# Patient Record
Sex: MALE | Race: WHITE | URBAN - METROPOLITAN AREA
[De-identification: names, ages, dates, MRNs, and addresses within clinical notes are randomized per-mention and may not be internally consistent; named-entity substitution may affect disease eponyms.]

---

## 2018-08-22 ENCOUNTER — OFFICE VISIT (OUTPATIENT)
Dept: FAMILY MEDICINE CLINIC | Age: 23
End: 2018-08-22

## 2018-08-22 VITALS
SYSTOLIC BLOOD PRESSURE: 103 MMHG | RESPIRATION RATE: 16 BRPM | WEIGHT: 181.2 LBS | HEIGHT: 71 IN | OXYGEN SATURATION: 100 % | HEART RATE: 54 BPM | BODY MASS INDEX: 25.37 KG/M2 | TEMPERATURE: 97.5 F | DIASTOLIC BLOOD PRESSURE: 60 MMHG

## 2018-08-22 DIAGNOSIS — E78.49 FAMILIAL HYPERLIPIDEMIA: ICD-10-CM

## 2018-08-22 DIAGNOSIS — Z76.89 ESTABLISHING CARE WITH NEW DOCTOR, ENCOUNTER FOR: ICD-10-CM

## 2018-08-22 DIAGNOSIS — Z00.00 ANNUAL PHYSICAL EXAM: Primary | ICD-10-CM

## 2018-08-22 DIAGNOSIS — Z13.1 SCREENING FOR DIABETES MELLITUS: ICD-10-CM

## 2018-08-22 NOTE — PROGRESS NOTES
Subjective:   Dorian Alfaro is a 21 y.o. male presenting for his annual checkup. He's new to our practice. Previous PCP: none. Goes to the gym at least once a day, sometimes twice, goes to gym 5-6X/week. Plays la-crost and ride bike at home. Denies CP, SOB, ALBERTS or syncope. Graduated college. Familial Hypercholesterolemia, and his father. Even at a younger age he have high cholesterol. ROS:  Feeling well. No dyspnea or chest pain on exertion. No abdominal pain, change in bowel habits, black or bloody stools. No urinary tract or prostatic symptoms. No neurological complaints. Patient Active Problem List    Diagnosis Date Noted    Hypercholesterolemia 07/13/2010    Lymphadenitis 02/09/2010     Current Outpatient Prescriptions   Medication Sig Dispense Refill    Psyllium Husk-Sucrose 3.4 gram/7 gram powd Take  by mouth. No Known Allergies  Past Surgical History:   Procedure Laterality Date    HX HEENT      IP CONSULT TO PLASTIC SURGERY      nose placed back      Family History   Problem Relation Age of Onset    Cancer Mother      cervical    Elevated Lipids Father      Social History   Substance Use Topics    Smoking status: Never Smoker    Smokeless tobacco: Never Used    Alcohol use 3.6 oz/week     5 Cans of beer, 1 Glasses of wine per week      Comment: 3 beers and 3 shot once a month          Objective:     Visit Vitals    /60 (BP 1 Location: Left arm, BP Patient Position: Sitting)    Pulse (!) 54    Temp 97.5 °F (36.4 °C) (Oral)    Resp 16    Ht 5' 10.5\" (1.791 m)    Wt 181 lb 3.2 oz (82.2 kg)    SpO2 100%    BMI 25.63 kg/m2       General:  Alert, cooperative, no distress, appears stated age. Head:  Normocephalic, without obvious abnormality, atraumatic. Eyes:  Conjunctivae/corneas clear. PERRL, EOMs intact. Ears:  Normal TMs and external ear canals both ears.    Throat: Lips, mucosa, and tongue normal. Teeth and gums normal.   Neck: Supple, symmetrical, trachea midline, no carotid bruit and no JVD. Lungs:   Clear to auscultation bilaterally. Heart:  Regular rate and rhythm, S1, S2 normal, no murmur, click, rub or gallop. Abdomen:   Soft, non-tender. Genitalia:  Pt refuse exam.     Extremities: Extremities normal, atraumatic, no cyanosis or edema. Pulses: 2+ and symmetric all extremities. Neurologic: CNII-XII intact. Normal strength, sensation and reflexes throughout. Assessment/Plan:     Diagnoses and all orders for this visit:    1. Annual physical exam  -     CBC W/O DIFF  -     HEMOGLOBIN A1C W/O EAG  -     METABOLIC PANEL, COMPREHENSIVE  -     TSH RFX ON ABNORMAL TO FREE T4  -     URINALYSIS W/ RFLX MICROSCOPIC  -     HIV 1/2 AG/AB, 4TH GENERATION,W RFLX CONFIRM  -     NMR LIPOPROFILE  -     LIPOPROTEIN (A)    2. Familial hyperlipidemia  -     NMR LIPOPROFILE  -     LIPOPROTEIN (A)    3. Screening for diabetes mellitus  -     HEMOGLOBIN A1C W/O EAG    4. Establishing care with new doctor, encounter for      Follow-up Disposition:  Return in about 1 week (around 8/29/2018) for labs review. Ayah Muhammad MD  8/22/2018

## 2018-08-22 NOTE — MR AVS SNAPSHOT
[address] [facility] [address] [facility] 
[phone] Patient: Kamini Cabrera MRN:  [id number] Visit Information Date & Time Provider Department Dept. Phone Encounter #  
 8/22/2018  9:20 AM Jorge L Byrd MD [facility] at [address] [id number] Follow-up Instructions Return in about 1 week (around 8/29/2018) for labs review. Upcoming Health Maintenance Date Due DTaP/Tdap/Td series (2 - Td) 6/26/2016 Influenza Age 5 to Adult 8/1/2018 Allergies as of 8/22/2018  Review Complete On: 8/22/2018 By: Jorge L Byrd MD  
 No Known Allergies Current Immunizations  Never Reviewed Name Date HPV (Quad) 8/13/2013, 7/10/2013 Hep A Vaccine (Adult) 7/10/2013 Influenza Vaccine Whole 10/9/2009 Meningococcal (MCV4P) Vaccine 7/10/2013 Meningococcal Vaccine 12/30/2008 TDAP Vaccine 6/26/2006 Varicella Virus Vaccine 7/10/2013 Varicella Virus Vaccine Live 12/30/2008 Not reviewed this visit You Were Diagnosed With   
  
 Codes Comments Annual physical exam    -  Primary ICD-10-CM: Z00.00 ICD-9-CM: V70.0 Familial hyperlipidemia     ICD-10-CM: E78.4 ICD-9-CM: 272.4 Screening for diabetes mellitus     ICD-10-CM: Z13.1 ICD-9-CM: V77.1 Establishing care with new doctor, encounter for     ICD-10-CM: Z76.89 
ICD-9-CM: V65.8 Vitals BP Pulse Temp Resp Height(growth percentile) Weight(growth percentile) 103/60 (BP 1 Location: Left arm, BP Patient Position: Sitting) (!) 54 97.5 °F (36.4 °C) (Oral) 16 5' 10.5\" (1.791 m) 181 lb 3.2 oz (82.2 kg) SpO2 BMI Smoking Status 100% 25.63 kg/m2 Never Smoker BMI and BSA Data Body Mass Index Body Surface Area  
 25.63 kg/m 2 2.02 m 2 Preferred Pharmacy Pharmacy Name Phone [facility] [address] [place] [address], [address] [facility] [phone] Your Updated Medication List  
  
   
This list is accurate as of 8/22/18  9:59 AM.  Always use your most recent med list.  
  
  
  
  
 Psyllium Husk-Sucrose 3.4 gram/7 gram Powd Take  by mouth. We Performed the Following CBC W/O DIFF [15333 CPT(R)] HEMOGLOBIN A1C W/O EAG [71144 CPT(R)] HIV 1/2 AG/AB, 4TH GENERATION,W RFLX CONFIRM [id number] CPT(R)] LIPOPROTEIN (A) [[id number] Custom] METABOLIC PANEL, COMPREHENSIVE [24786 CPT(R)] NMR LIPOPROFILE [id number] CPT(R)] TSH RFX ON ABNORMAL TO FREE T4 [[id number] Custom] URINALYSIS W/ RFLX MICROSCOPIC [00657 CPT(R)] Follow-up Instructions Return in about 1 week (around 8/29/2018) for labs review. Introducing [facility]! Cayla Conway introduces [facility] patient portal. Now you can access parts of your medical record, email your doctor's office, and request medication refills online. 1. In your internet browser, go to [email or web address] 2. Click on the First Time User? Click Here link in the Sign In box. You will see the New Member Sign Up page. 3. Enter your [facility] Access Code exactly as it appears below. You will not need to use this code after youve completed the sign-up process. If you do not sign up before the expiration date, you must request a new code. · [facility] Access Code: [email or web address] Expires: 11/20/2018  9:39 AM 
 
4. Enter the last four digits of your Social Security Number (xxxx) and Date of Birth (mm/dd/yyyy) as indicated and click Submit. You will be taken to the next sign-up page. 5. Create a FansUnitet ID. This will be your [facility] login ID and cannot be changed, so think of one that is secure and easy to remember. 6. Create a [facility] password. You can change your password at any time. 7. Enter your Password Reset Question and Answer. This can be used at a later time if you forget your password. 8. Enter your e-mail address. You will receive e-mail notification when new information is available in [address] 9. Click Sign Up. You can now view and download portions of your medical record. 10. Click the Download Summary menu link to download a portable copy of your medical information. If you have questions, please visit the Frequently Asked Questions section of the [employer] website. Remember, [facility] is NOT to be used for urgent needs. For medical emergencies, dial 911. Now available from your iPhone and Android! Please provide this summary of care documentation to your next provider. Your primary care clinician is listed as Patsy Grayson. If you have any questions after today's visit, please call [phone].

## 2018-08-22 NOTE — PROGRESS NOTES
Chief Complaint   Patient presents with    New Patient    Complete Physical     Establish care wants CPE with labs.

## 2018-08-23 LAB
ALBUMIN SERPL-MCNC: 5 G/DL (ref 3.5–5.5)
ALBUMIN/GLOB SERPL: 2.2 {RATIO} (ref 1.2–2.2)
ALP SERPL-CCNC: 58 IU/L (ref 39–117)
ALT SERPL-CCNC: 21 IU/L (ref 0–44)
APPEARANCE UR: CLEAR
AST SERPL-CCNC: 28 IU/L (ref 0–40)
BILIRUB SERPL-MCNC: 0.4 MG/DL (ref 0–1.2)
BILIRUB UR QL STRIP: NEGATIVE
BUN SERPL-MCNC: 14 MG/DL (ref 6–20)
BUN/CREAT SERPL: 13 (ref 9–20)
CALCIUM SERPL-MCNC: 9.9 MG/DL (ref 8.7–10.2)
CHLORIDE SERPL-SCNC: 100 MMOL/L (ref 96–106)
CHOLEST SERPL-MCNC: 202 MG/DL (ref 100–199)
CO2 SERPL-SCNC: 23 MMOL/L (ref 20–29)
COLOR UR: YELLOW
CREAT SERPL-MCNC: 1.05 MG/DL (ref 0.76–1.27)
ERYTHROCYTE [DISTWIDTH] IN BLOOD BY AUTOMATED COUNT: 13.3 % (ref 12.3–15.4)
GLOBULIN SER CALC-MCNC: 2.3 G/DL (ref 1.5–4.5)
GLUCOSE SERPL-MCNC: 78 MG/DL (ref 65–99)
GLUCOSE UR QL: NEGATIVE
HBA1C MFR BLD: 4.9 % (ref 4.8–5.6)
HCT VFR BLD AUTO: 44.6 % (ref 37.5–51)
HDL SERPL-SCNC: 40.9 UMOL/L
HDLC SERPL-MCNC: 80 MG/DL
HGB BLD-MCNC: 14.4 G/DL (ref 13–17.7)
HGB UR QL STRIP: NEGATIVE
HIV 1+2 AB+HIV1 P24 AG SERPL QL IA: NON REACTIVE
KETONES UR QL STRIP: NEGATIVE
LDL SERPL QN: 21.9 NM
LDL SERPL-SCNC: 1071 NMOL/L
LDL SMALL SERPL-SCNC: <90 NMOL/L
LDLC SERPL CALC-MCNC: 105 MG/DL (ref 0–99)
LEUKOCYTE ESTERASE UR QL STRIP: NEGATIVE
LP-IR SCORE SERPL: <25
LPA SERPL-SCNC: 107 NMOL/L
MCH RBC QN AUTO: 28.9 PG (ref 26.6–33)
MCHC RBC AUTO-ENTMCNC: 32.3 G/DL (ref 31.5–35.7)
MCV RBC AUTO: 89 FL (ref 79–97)
MICRO URNS: NORMAL
NITRITE UR QL STRIP: NEGATIVE
PH UR STRIP: 7 [PH] (ref 5–7.5)
PLATELET # BLD AUTO: 172 X10E3/UL (ref 150–379)
POTASSIUM SERPL-SCNC: 4.4 MMOL/L (ref 3.5–5.2)
PROT SERPL-MCNC: 7.3 G/DL (ref 6–8.5)
PROT UR QL STRIP: NEGATIVE
RBC # BLD AUTO: 4.99 X10E6/UL (ref 4.14–5.8)
SODIUM SERPL-SCNC: 140 MMOL/L (ref 134–144)
SP GR UR: 1.01 (ref 1–1.03)
TRIGL SERPL-MCNC: 83 MG/DL (ref 0–149)
TSH SERPL DL<=0.005 MIU/L-ACNC: 0.94 UIU/ML (ref 0.45–4.5)
UROBILINOGEN UR STRIP-MCNC: 0.2 MG/DL (ref 0.2–1)
WBC # BLD AUTO: 4.1 X10E3/UL (ref 3.4–10.8)

## 2018-08-29 ENCOUNTER — OFFICE VISIT (OUTPATIENT)
Dept: FAMILY MEDICINE CLINIC | Age: 23
End: 2018-08-29

## 2018-08-29 VITALS
TEMPERATURE: 97.9 F | OXYGEN SATURATION: 99 % | HEIGHT: 71 IN | DIASTOLIC BLOOD PRESSURE: 60 MMHG | BODY MASS INDEX: 25.48 KG/M2 | HEART RATE: 62 BPM | WEIGHT: 182 LBS | SYSTOLIC BLOOD PRESSURE: 104 MMHG | RESPIRATION RATE: 16 BRPM

## 2018-08-29 DIAGNOSIS — E78.79 FAMILIAL HYPERCHOLANEMIA: Primary | ICD-10-CM

## 2018-08-29 NOTE — PROGRESS NOTES
Naresh Son is a 21 y.o. male     has a past medical history of Acne; Familial hypercholanemia (8/29/2018); and Hypercholesteremia. Hx of familial hyperlipidemia. Have had high cholesterol since his childhood. Goes to the gym at least once a day, sometimes twice, goes to gym 5-6X/week. doing cardio, work out, runs, bikes. Plays la-crost.    He's have been cutting down in red meat. Denies CP, SOB, ALBERTS or syncope. He's very concerned about his risk of CAD. We discussed his labs and cholesterol levels in details. Education about diet, exercise, risk factors, prevention, recognizing angina. For now b/c of his age, high HDL, and healthy lifestyle. I do not think a statin or aspirin benefits will outweight risk. He agrees and doesn't want to be on statin. Reviewed: active problem list, medication list, allergies, notes from last encounter, lab results    A comprehensive review of systems was negative except for that written in the HPI.     Results for orders placed or performed in visit on 08/22/18   CBC W/O DIFF   Result Value Ref Range    WBC 4.1 3.4 - 10.8 x10E3/uL    RBC 4.99 4.14 - 5.80 x10E6/uL    HGB 14.4 13.0 - 17.7 g/dL    HCT 44.6 37.5 - 51.0 %    MCV 89 79 - 97 fL    MCH 28.9 26.6 - 33.0 pg    MCHC 32.3 31.5 - 35.7 g/dL    RDW 13.3 12.3 - 15.4 %    PLATELET 295 810 - 805 x10E3/uL   HEMOGLOBIN A1C W/O EAG   Result Value Ref Range    Hemoglobin A1c 4.9 4.8 - 5.6 %   METABOLIC PANEL, COMPREHENSIVE   Result Value Ref Range    Glucose 78 65 - 99 mg/dL    BUN 14 6 - 20 mg/dL    Creatinine 1.05 0.76 - 1.27 mg/dL    GFR est non- >59 mL/min/1.73    GFR est  >59 mL/min/1.73    BUN/Creatinine ratio 13 9 - 20    Sodium 140 134 - 144 mmol/L    Potassium 4.4 3.5 - 5.2 mmol/L    Chloride 100 96 - 106 mmol/L    CO2 23 20 - 29 mmol/L    Calcium 9.9 8.7 - 10.2 mg/dL    Protein, total 7.3 6.0 - 8.5 g/dL    Albumin 5.0 3.5 - 5.5 g/dL    GLOBULIN, TOTAL 2.3 1.5 - 4.5 g/dL    A-G Ratio 2. 2 1.2 - 2.2    Bilirubin, total 0.4 0.0 - 1.2 mg/dL    Alk. phosphatase 58 39 - 117 IU/L    AST (SGOT) 28 0 - 40 IU/L    ALT (SGPT) 21 0 - 44 IU/L   TSH RFX ON ABNORMAL TO FREE T4   Result Value Ref Range    TSH 0.935 0.450 - 4.500 uIU/mL   URINALYSIS W/ RFLX MICROSCOPIC   Result Value Ref Range    Specific Gravity 1.007 1.005 - 1.030    pH (UA) 7.0 5.0 - 7.5    Color Yellow Yellow    Appearance Clear Clear    Leukocyte Esterase Negative Negative    Protein Negative Negative/Trace    Glucose Negative Negative    Ketone Negative Negative    Blood Negative Negative    Bilirubin Negative Negative    Urobilinogen 0.2 0.2 - 1.0 mg/dL    Nitrites Negative Negative    Microscopic Examination Comment    HIV 1/2 AG/AB, 4TH GENERATION,W RFLX CONFIRM   Result Value Ref Range    HIV SCREEN 4TH GENERATION WRFX Non Reactive Non Reactive   NMR LIPOPROFILE   Result Value Ref Range    LDL-P 1071 (H) <1000 nmol/L    LDL-C 105 (H) 0 - 99 mg/dL    HDL-C 80 >39 mg/dL    Triglycerides 83 0 - 149 mg/dL    Cholesterol, Total 202 (H) 100 - 199 mg/dL    HDL-P (Total) 40.9 >=30.5 umol/L    Small LDL-P <90 <=527 nmol/L    LDL size 21.9 >20.5 nm    LP-IR SCORE <25 <=45   LIPOPROTEIN (A)   Result Value Ref Range    Lipoprotein (a) 107 (H) <75 nmol/L       No Known Allergies  Current Outpatient Prescriptions on File Prior to Visit   Medication Sig Dispense Refill    Psyllium Husk-Sucrose 3.4 gram/7 gram powd Take  by mouth. No current facility-administered medications on file prior to visit.       Patient Active Problem List   Diagnosis Code    Lymphadenitis I88.9    Hypercholesterolemia E78.00    Familial hypercholanemia E78.70       Visit Vitals    /60 (BP 1 Location: Left arm, BP Patient Position: Sitting)    Pulse 62    Temp 97.9 °F (36.6 °C) (Oral)    Resp 16    Ht 5' 10.5\" (1.791 m)    Wt 182 lb (82.6 kg)    SpO2 99%    BMI 25.75 kg/m2       General appearance: alert, cooperative, no distress, appears stated age  Neurologic: Alert and oriented X 3, normal strength and tone, symmetric. Normal without focal findings. Cranial nerves 2-12 intact. Normal coordination and gait. Mental status: Alert, oriented, thought content appropriate, affect: stable, mood-congruent. Head: Normocephalic, without obvious abnormality, atraumatic  Eyes: conjunctivae/corneas clear. PERRL, EOM's intact. Neck: supple, symmetrical, trachea midline, no JVD  Lungs: clear to auscultation bilaterally  Heart: regular rate and rhythm, S1, S2 normal, no murmur, click, rub or gallop  Abdomen: soft, non-tender. Extremities: extremities normal, atraumatic, no cyanosis or edema      Assessment/Plans: We spent > 25mins going over the details of his labs and cholesterol, familial hyperlipidemia, exercise, diet, prevention. Discussed and have him decide about statin and aspirin. Diagnoses and all orders for this visit:    1. Familial hypercholanemia  -     LIPID CASCADE  -     LIPOPROTEIN (A)      Discussed plans, risk/benefits of treatments/observations. Through the use of shared decision making, above plans were agreed upon. Medication compliance advised. Patient verbalized understanding. Follow-up Disposition:  Return in about 6 months (around 2/28/2019) for lipoprotein.       Zayda Stuart MD  8/29/2018

## 2018-08-29 NOTE — LETTER
8/29/2018 9:51 AM 
 
Mr. Alfredo Peres [address] 
[place] [address] Dear Alfredo Peres: 
 
Please find your most recent results below. Resulted Orders CBC W/O DIFF Result Value Ref Range WBC 4.1 3.4 - 10.8 x10E3/uL  
 RBC 4.99 4.14 - 5.80 x10E6/uL HGB 14.4 13.0 - 17.7 g/dL HCT 44.6 37.5 - 51.0 % MCV 89 79 - 97 fL  
 MCH 28.9 26.6 - 33.0 pg  
 MCHC 32.3 31.5 - 35.7 g/dL  
 RDW 13.3 12.3 - 15.4 % PLATELET 084 506 - 556 x10E3/uL Narrative Performed at:  [address]  [id number] : Edwin Strong MD, Phone:  [phone] HEMOGLOBIN A1C W/O EAG Result Value Ref Range Hemoglobin A1c 4.9 4.8 - 5.6 % Comment:  
            Prediabetes: 5.7 - 6.4 Diabetes: >6.4 Glycemic control for adults with diabetes: <7.0 Narrative Performed at:  [address]  [id number] : Edwin Strong MD, Phone:  [phone] METABOLIC PANEL, COMPREHENSIVE Result Value Ref Range Glucose 78 65 - 99 mg/dL BUN 14 6 - 20 mg/dL Creatinine 1.05 0.76 - 1.27 mg/dL GFR est non- >59 mL/min/1.73 GFR est  >59 mL/min/1.73  
 BUN/Creatinine ratio 13 9 - 20 Sodium 140 134 - 144 mmol/L Potassium 4.4 3.5 - 5.2 mmol/L Chloride 100 96 - 106 mmol/L  
 CO2 23 20 - 29 mmol/L Calcium 9.9 8.7 - 10.2 mg/dL Protein, total 7.3 6.0 - 8.5 g/dL Albumin 5.0 3.5 - 5.5 g/dL GLOBULIN, TOTAL 2.3 1.5 - 4.5 g/dL A-G Ratio 2.2 1.2 - 2.2 Bilirubin, total 0.4 0.0 - 1.2 mg/dL Alk. phosphatase 58 39 - 117 IU/L  
 AST (SGOT) 28 0 - 40 IU/L  
 ALT (SGPT) 21 0 - 44 IU/L Narrative Performed at:  [address]  [id number] : Edwin Strong MD, Phone:  [phone] TSH RFX ON ABNORMAL TO FREE T4 Result Value Ref Range TSH 0.935 0.450 - 4.500 uIU/mL Narrative Performed at:  [address]  [id number] : Rasheed Hernandez MD, Phone:  [phone] URINALYSIS W/ RFLX MICROSCOPIC Result Value Ref Range Specific Gravity 1.007 1.005 - 1.030  
 pH (UA) 7.0 5.0 - 7.5 Color Yellow Yellow Appearance Clear Clear Leukocyte Esterase Negative Negative Protein Negative Negative/Trace Glucose Negative Negative Ketone Negative Negative Blood Negative Negative Bilirubin Negative Negative Urobilinogen 0.2 0.2 - 1.0 mg/dL Nitrites Negative Negative Microscopic Examination Comment Comment:  
   Microscopic not indicated and not performed. Narrative Performed at:  [address]  [id number] : Rasheed Hernandez MD, Phone:  [phone] HIV 1/2 AG/AB, 4TH GENERATION,W RFLX CONFIRM Result Value Ref Range HIV SCREEN 4TH GENERATION WRFX Non Reactive Non Reactive Narrative Performed at:  [address]  [id number] : Rasheed Hernandez MD, Phone:  [phone] NMR LIPOPROFILE Result Value Ref Range LDL-P 1071 (H) <1000 nmol/L Comment: Low                   < 1000 Moderate         1000 - 1299 Borderline-High  1300 - 1599 High             1600 - 2000 Very High             > 2000 LDL-C 105 (H) 0 - 99 mg/dL Comment:  
                             Optimal               <  100 Above optimal     100 -  129 Borderline        130 -  159 High              160 -  189 Very high             >  189 LDL-C is inaccurate if patient is non-fasting. HDL-C 80 >39 mg/dL Triglycerides 83 0 - 149 mg/dL Cholesterol, Total 202 (H) 100 - 199 mg/dL HDL-P (Total) 40.9 >=30.5 umol/L Small LDL-P <90 <=527 nmol/L  
 LDL size 21.9 >20.5 nm  
   Comment:  
    ---------------------------------------------------------- 
                 ** INTERPRETATIVE INFORMATION** PARTICLE CONCENTRATION AND SIZE 
                    <--Lower CVD Risk   Higher CVD Risk--> 
  LDL AND HDL PARTICLES   Percentile in Reference Population HDL-P (total)        High     75th    50th    25th   Low 
                       >34.9    34.9    30.5    26.7   <26.7 Small LDL-P          Low      25th    50th    75th   High 
                       <117     117     527     839    >839 LDL Size   <-Large (Pattern A)->    <-Small (Pattern B)-> 
                    23.0    20.6           20.5      19.0 
 ---------------------------------------------------------- Small LDL-P and LDL Size are associated with CVD risk, but not after LDL-P is taken into account. These assays were developed and their performance characteristics 
determined by [employer]. These assays have not been cleared by the 
[employer] and Drug Administration. The clinical utility o 
f these 
laboratory values have not been fully established. LP-IR SCORE <25 <=45 Comment: INSULIN RESISTANCE MARKER 
    <--Insulin Sensitive    Insulin Resistant--> Percentile in Reference Population Insulin Resistance Score LP-IR Score   Low   25th   50th   75th   High 
              <27   27     45     63     >63 LP-IR Score is inaccurate if patient is non-fasting. The LP-IR score is a laboratory developed index that has been 
associated with insulin resistance and diabetes risk and should be 
used as one component of a physician's clinical assessment. The 
LP-IR score listed above has not been cleared by the [employer] and 
Drug Administration. Narrative Performed at:  [address]  [id number] : Renan Gao MD, Phone:  [phone] LIPOPROTEIN (A) Result Value Ref Range Lipoprotein (a) 107 (H) <75 nmol/L Comment:  
   Note:  Values greater than or equal to 75 nmol/L may 
       indicate an independent risk factor for CHD, 
       but must be evaluated with caution when applied 
       to non- populations due to the 
       influence of genetic factors on Lp(a) across 
       ethnicities. Narrative Performed at:  [address]  [id number] : Renan Gao MD, Phone:  [phone] Sincerely, Jorge L Byrd MD

## 2018-08-29 NOTE — PATIENT INSTRUCTIONS
Learning About High Cholesterol  What is high cholesterol? Cholesterol is a type of fat in your blood. It is needed for many body functions, such as making new cells. Cholesterol is made by your body. It also comes from food you eat. If you have too much cholesterol, it starts to build up in your arteries. This is called hardening of the arteries, or atherosclerosis. High cholesterol raises your risk of a heart attack and stroke. There are different types of cholesterol. LDL is the \"bad\" cholesterol. High LDL can raise your risk for heart disease, heart attack, and stroke. HDL is the \"good\" cholesterol. High HDL is linked with a lower risk for heart disease, heart attack, and stroke. Your cholesterol levels help your doctor find out your risk for having a heart attack or stroke. How can you prevent high cholesterol? A heart-healthy lifestyle can help you prevent high cholesterol. This lifestyle helps lower your risk for a heart attack and stroke. · Eat heart-healthy foods. ¨ Eat fruits, vegetables, whole grains (like oatmeal), dried beans and peas, nuts and seeds, soy products (like tofu), and fat-free or low-fat dairy products. ¨ Replace butter, margarine, and hydrogenated or partially hydrogenated oils with olive and canola oils. (Canola oil margarine without trans fat is fine.)  ¨ Replace red meat with fish, poultry, and soy protein (like tofu). ¨ Limit processed and packaged foods like chips, crackers, and cookies. · Be active. Exercise can improve your cholesterol level. Get at least 30 minutes of exercise on most days of the week. Walking is a good choice. You also may want to do other activities, such as running, swimming, cycling, or playing tennis or team sports. · Stay at a healthy weight. Lose weight if you need to. · Don't smoke. If you need help quitting, talk to your doctor about stop-smoking programs and medicines. These can increase your chances of quitting for good.   How is high cholesterol treated? The goal of treatment is to reduce your chances of having a heart attack or stroke. The goal is not to lower your cholesterol numbers only. · You may make lifestyle changes, such as eating healthy foods, not smoking, losing weight, and being more active. · You may have to take medicine. Follow-up care is a key part of your treatment and safety. Be sure to make and go to all appointments, and call your doctor if you are having problems. It's also a good idea to know your test results and keep a list of the medicines you take. Where can you learn more? Go to http://luan-lea.info/. Enter F658 in the search box to learn more about \"Learning About High Cholesterol. \"  Current as of: May 10, 2017  Content Version: 11.7  © 4606-9653 [employer]. Care instructions adapted under license by [employer] (which disclaims liability or warranty for this information). If you have questions about a medical condition or this instruction, always ask your healthcare professional. [address] any warranty or liability for your use of this information.

## 2018-08-29 NOTE — MR AVS SNAPSHOT
[address] [facility] 
[phone] Patient: Papo Nixon MRN:  [id number] Visit Information Date & Time Provider Department Dept. Phone Encounter #  
 8/29/2018  9:20 AM Leslie Kapadia MD [facility] at [address] [id number] Follow-up Instructions Return in about 6 months (around 2/28/2019) for lipoprotein. Upcoming Health Maintenance Date Due DTaP/Tdap/Td series (2 - Td) 6/26/2016 Influenza Age 5 to Adult 8/1/2018 Allergies as of 8/29/2018  Review Complete On: 8/29/2018 By: Madeline Hooper LPN No Known Allergies Current Immunizations  Never Reviewed Name Date HPV (Quad) 8/13/2013, 7/10/2013 Hep A Vaccine (Adult) 7/10/2013 Influenza Vaccine Whole 10/9/2009 Meningococcal (MCV4P) Vaccine 7/10/2013 Meningococcal Vaccine 12/30/2008 TDAP Vaccine 6/26/2006 Varicella Virus Vaccine 7/10/2013 Varicella Virus Vaccine Live 12/30/2008 Not reviewed this visit You Were Diagnosed With   
  
 Codes Comments Familial hypercholanemia    -  Primary ICD-10-CM: U68.72 ICD-9-CM: 277.89 Vitals BP Pulse Temp Resp Height(growth percentile) Weight(growth percentile) 104/60 (BP 1 Location: Left arm, BP Patient Position: Sitting) 62 97.9 °F (36.6 °C) (Oral) 16 5' 10.5\" (1.791 m) 182 lb (82.6 kg) SpO2 BMI Smoking Status 99% 25.75 kg/m2 Never Smoker BMI and BSA Data Body Mass Index Body Surface Area 25.75 kg/m 2 2.03 m 2 Preferred Pharmacy Pharmacy Name Phone [address] [facility] [address] , [address] [facility] [phone] Your Updated Medication List  
  
   
This list is accurate as of 8/29/18 10:16 AM.  Always use your most recent med list.  
  
  
  
  
 Psyllium Husk-Sucrose 3.4 gram/7 gram Powd Take  by mouth. We Performed the Following LIPID CASCADE [id number] CPT(R)] LIPOPROTEIN (A) [[id number] Custom] Follow-up Instructions Return in about 6 months (around 2/28/2019) for lipoprotein. Patient Instructions Learning About High Cholesterol What is high cholesterol? Cholesterol is a type of fat in your blood. It is needed for many body functions, such as making new cells. Cholesterol is made by your body. It also comes from food you eat. If you have too much cholesterol, it starts to build up in your arteries. This is called hardening of the arteries, or atherosclerosis. High cholesterol raises your risk of a heart attack and stroke. There are different types of cholesterol. LDL is the \"bad\" cholesterol. High LDL can raise your risk for heart disease, heart attack, and stroke. HDL is the \"good\" cholesterol. High HDL is linked with a lower risk for heart disease, heart attack, and stroke. Your cholesterol levels help your doctor find out your risk for having a heart attack or stroke. How can you prevent high cholesterol? A heart-healthy lifestyle can help you prevent high cholesterol. This lifestyle helps lower your risk for a heart attack and stroke. · Eat heart-healthy foods. ¨ Eat fruits, vegetables, whole grains (like oatmeal), dried beans and peas, nuts and seeds, soy products (like tofu), and fat-free or low-fat dairy products. ¨ Replace butter, margarine, and hydrogenated or partially hydrogenated oils with olive and canola oils. (Canola oil margarine without trans fat is fine.) ¨ Replace red meat with fish, poultry, and soy protein (like tofu). ¨ Limit processed and packaged foods like chips, crackers, and cookies. · Be active. Exercise can improve your cholesterol level. Get at least 30 minutes of exercise on most days of the week. Walking is a good choice. You also may want to do other activities, such as running, swimming, cycling, or playing tennis or team sports. · Stay at a healthy weight. Lose weight if you need to. · Don't smoke. If you need help quitting, talk to your doctor about stop-smoking programs and medicines. These can increase your chances of quitting for good. How is high cholesterol treated? The goal of treatment is to reduce your chances of having a heart attack or stroke. The goal is not to lower your cholesterol numbers only. · You may make lifestyle changes, such as eating healthy foods, not smoking, losing weight, and being more active. · You may have to take medicine. Follow-up care is a key part of your treatment and safety. Be sure to make and go to all appointments, and call your doctor if you are having problems. It's also a good idea to know your test results and keep a list of the medicines you take. Where can you learn more? Go to http://luan-lea.info/. Enter A876 in the search box to learn more about \"Learning About High Cholesterol. \" Current as of: May 10, 2017 Content Version: 11.7 © 8513-9436 [employer]. Care instructions adapted under license by [employer] (which disclaims liability or warranty for this information). If you have questions about a medical condition or this instruction, always ask your healthcare professional. [address] any warranty or liability for your use of this information. Introducing [facility]! Mile Rinaldi introduces [employer] patient portal. Now you can access parts of your medical record, email your doctor's office, and request medication refills online. 1. In your internet browser, go to [email or web address] 2. Click on the First Time User? Click Here link in the Sign In box. You will see the New Member Sign Up page. 3. Enter your [employer] Access Code exactly as it appears below. You will not need to use this code after youve completed the sign-up process.  If you do not sign up before the expiration date, you must request a new code. · [employer] Access Code: [email or web address] Expires: 11/20/2018  9:39 AM 
 
4. Enter the last four digits of your Social Security Number (xxxx) and Date of Birth (mm/dd/yyyy) as indicated and click Submit. You will be taken to the next sign-up page. 5. Create a [employer] ID. This will be your [employer] login ID and cannot be changed, so think of one that is secure and easy to remember. 6. Create a [employer] password. You can change your password at any time. 7. Enter your Password Reset Question and Answer. This can be used at a later time if you forget your password. 8. Enter your e-mail address. You will receive e-mail notification when new information is available in [address] 9. Click Sign Up. You can now view and download portions of your medical record. 10. Click the Download Summary menu link to download a portable copy of your medical information. If you have questions, please visit the Frequently Asked Questions section of the [employer] website. Remember, [employer] is NOT to be used for urgent needs. For medical emergencies, dial 911. Now available from your iPhone and Android! Please provide this summary of care documentation to your next provider. Your primary care clinician is listed as Wilber Hackett. If you have any questions after today's visit, please call [phone].

## 2019-02-24 LAB
CHOLEST SERPL-MCNC: 181 MG/DL (ref 100–199)
HDL SERPL-SCNC: 37.8 UMOL/L
HDLC SERPL-MCNC: 72 MG/DL
LDL SERPL QN: 21.8 NM
LDL SERPL-SCNC: 975 NMOL/L
LDL SMALL SERPL-SCNC: 153 NMOL/L
LDLC SERPL CALC-MCNC: 96 MG/DL (ref 0–99)
LDLC/HDLC SERPL: 1.3 RATIO (ref 0–3.6)
LP-IR SCORE SERPL: <25
LPA SERPL-SCNC: 104 NMOL/L
NONHDLC SERPL-MCNC: 109 MG/DL (ref 0–129)
TRIGL SERPL-MCNC: 66 MG/DL (ref 0–149)

## 2019-02-28 ENCOUNTER — OFFICE VISIT (OUTPATIENT)
Dept: FAMILY MEDICINE CLINIC | Age: 24
End: 2019-02-28

## 2019-02-28 VITALS
HEART RATE: 52 BPM | HEIGHT: 71 IN | SYSTOLIC BLOOD PRESSURE: 109 MMHG | WEIGHT: 184.6 LBS | RESPIRATION RATE: 16 BRPM | TEMPERATURE: 97.5 F | BODY MASS INDEX: 25.84 KG/M2 | DIASTOLIC BLOOD PRESSURE: 53 MMHG | OXYGEN SATURATION: 100 %

## 2019-02-28 DIAGNOSIS — E78.79 FAMILIAL HYPERCHOLANEMIA: Primary | ICD-10-CM

## 2019-02-28 NOTE — PROGRESS NOTES
Chief Complaint Patient presents with  Results 6 mo check 1. Have you been to the ER, urgent care clinic since your last visit? Hospitalized since your last visit? no 
 
2. Have you seen or consulted any other health care providers outside of the [address] [place] [address] since your last visit? Include any pap smears or colon screening.  no

## 2019-02-28 NOTE — PROGRESS NOTES
Vandana Pang is a 21 y.o. male 
 
 has a past medical history of Acne, Familial hypercholanemia (8/29/2018), and Hypercholesteremia. Hx of familial hyperlipidemia. Have had high cholesterol since his childhood. Goes to the gym at least once a day, sometimes twice, goes to gym 5-6X/week. doing cardio, work out, runs, bikes. Plays la-crost.   
He's have been cutting out all red meat for the past 6 months. Denies CP, SOB, ALBERTS or syncope. He's very concerned about his risk of CAD. His father had is [phone] and cardiac cath. We discussed his labs and cholesterol levels in details. Education about diet, exercise, risk factors, prevention, recognizing angina. For now b/c of his age, high HDL, and healthy lifestyle. I do not think a statin or aspirin benefits will outweight risk. He agrees and doesn't want to be on statin. Discussed the patient's BMI with him. The BMI follow up plan is as follows:  
dietary management education, guidance, and counseling encourage exercise monitor weight prescribed dietary intake Reviewed: active problem list, medication list, allergies, notes from last encounter, lab results A comprehensive review of systems was negative except for that written in the HPI. Results for orders placed or performed in visit on 08/29/18 LIPID CASCADE Result Value Ref Range Cholesterol, total 181 100 - 199 mg/dL HDL Cholesterol 72 >39 mg/dL LDL/HDL Ratio 1.3 0.0 - 3.6 ratio Non-HDL Cholesterol 109 0 - 129 mg/dL Triglyceride 66 0 - 149 mg/dL LDL, calculated 96 0 - 99 mg/dL LIPOPROTEIN (A) Result Value Ref Range Lipoprotein (a) 104 (H) <75 nmol/L  
LIPOPROTEIN ANALYSIS, BY NMR Result Value Ref Range LDL-P 975 <1,000 nmol/L HDL-P (Total) 37.8 >=30.5 umol/L Small LDL-P 153 <=527 nmol/L  
 LDL size 21.8 >20.5 nm  
 LP-IR SCORE <25 <=45 No Known Allergies Current Outpatient Medications on File Prior to Visit Medication Sig Dispense Refill  Psyllium Husk-Sucrose 3.4 gram/7 gram powd Take  by mouth. No current facility-administered medications on file prior to visit. Patient Active Problem List  
Diagnosis Code  Lymphadenitis I88.9  Hypercholesterolemia E78.00  Familial hypercholanemia E78.70 Visit Vitals /53 Pulse (!) 52 Temp 97.5 °F (36.4 °C) (Oral) Resp 16 Ht 5' 10.5\" (1.791 m) Wt 184 lb 9.6 oz (83.7 kg) SpO2 100% BMI 26.11 kg/m² General appearance: alert, cooperative, no distress, appears stated age Neurologic: Alert and oriented X 3, normal strength and tone, symmetric. Normal without focal findings. Cranial nerves 2-12 intact. Normal coordination and gait. Mental status: Alert, oriented, thought content appropriate, affect: stable, mood-congruent. Head: Normocephalic, without obvious abnormality, atraumatic Eyes: conjunctivae/corneas clear. PERRL, EOM's intact. Neck: supple, symmetrical, trachea midline, no JVD Lungs: clear to auscultation bilaterally Heart: regular rate and rhythm, S1, S2 normal, no murmur, click, rub or gallop Abdomen: soft, non-tender. Extremities: extremities normal, atraumatic, no cyanosis or edema Assessment/Plans: We spent > 25mins going over the details of his labs and cholesterol, familial hyperlipidemia, exercise, diet, prevention. Discussed and have him decide about statin and aspirin. Diagnoses and all orders for this visit: 
 
1. Familial hypercholanemia -     LIPOPROTEIN (A); Future -     NMR LIPOPROFILE; Future Discussed plans, risk/benefits of treatments/observations. Through the use of shared decision making, above plans were agreed upon. Medication compliance advised. Patient verbalized understanding. Follow-up Disposition: 
Return in about 6 months (around 8/28/2019) for annual exam. 
 
 
Wally Holland MD 
2/28/2019

## 2019-02-28 NOTE — LETTER
2/28/2019 8:35 AM 
 
Mr. Jose Hernandez [address] [address] 
[facility] Dear Jose Hernandez: 
 
Please find your most recent results below. Resulted Orders LIPID CASCADE Result Value Ref Range Cholesterol, total 181 100 - 199 mg/dL HDL Cholesterol 72 >39 mg/dL LDL/HDL Ratio 1.3 0.0 - 3.6 ratio Comment: LDL/HDL Ratio Men  Women 1/2 Avg. Risk  1.0    1.5 Avg.Risk  3.6    3.2 
                               2X Avg. Risk  6.2    5.0 
                               3X Avg. Risk  8.0    6.1 Non-HDL Cholesterol 109 0 - 129 mg/dL Triglyceride 66 0 - 149 mg/dL LDL, calculated 96 0 - 99 mg/dL Narrative Performed at:  [address]  [id number] : Sue Giron MD, Phone:  [phone] LIPOPROTEIN (A) Result Value Ref Range Lipoprotein (a) 104 (H) <75 nmol/L Comment:  
   Note:  Values greater than or equal to 75 nmol/L may 
       indicate an independent risk factor for CHD, 
       but must be evaluated with caution when applied 
       to non- populations due to the 
       influence of genetic factors on Lp(a) across 
       ethnicities. Narrative Performed at:  [address]  [id number] : Sue Giron MD, Phone:  [phone] LIPOPROTEIN ANALYSIS, BY NMR Result Value Ref Range LDL-P 975 <1,000 nmol/L Comment: Low                   < 1000 Moderate         1000 - 1299 Borderline-High  1300 - 1599 High             1600 - 2000 Very High             > 2000 HDL-P (Total) 37.8 >=30.5 umol/L  Small LDL-P 153 <=527 nmol/L  
 LDL size 21.8 >20.5 nm  
   Comment:  
    ---------------------------------------------------------- 
                 ** INTERPRETATIVE INFORMATION** PARTICLE CONCENTRATION AND SIZE 
                    <--Lower CVD Risk   Higher CVD Risk--> 
  LDL AND HDL PARTICLES   Percentile in Reference Population HDL-P (total)        High     75th    50th    25th   Low 
                       >34.9    34.9    30.5    26.7   <26.7 Small LDL-P          Low      25th    50th    75th   High 
                       <117     117     527     839    >839 LDL Size   <-Large (Pattern A)->    <-Small (Pattern B)-> 
                    23.0    20.6           20.5      19.0 
 ---------------------------------------------------------- Small LDL-P and LDL Size are associated with CVD risk, but not after LDL-P is taken into account. These assays were developed and their performance characteristics 
determined by [employer]. These assays have not been cleared by the 
[employer] and Drug Administration. The clinical utility o 
f these 
laboratory values have not been fully established. LP-IR SCORE <25 <=45 Comment: INSULIN RESISTANCE MARKER 
    <--Insulin Sensitive    Insulin Resistant--> Percentile in Reference Population Insulin Resistance Score LP-IR Score   Low   25th   50th   75th   High 
              <27   27     45     63     >63 LP-IR Score is inaccurate if patient is non-fasting. The LP-IR score is a laboratory developed index that has been 
associated with insulin resistance and diabetes risk and should be 
used as one component of a physician's clinical assessment. The 
LP-IR score listed above has not been cleared by the [employer] and 
Drug Administration. Narrative Performed at:  [address]  [id number] : Desirae Huynh MD, Phone:  [phone] Sincerely, Gladis Rossi MD

## 2019-02-28 NOTE — PATIENT INSTRUCTIONS
Body Mass Index: Care Instructions Your Care Instructions Body mass index (BMI) can help you see if your weight is raising your risk for health problems. It uses a formula to compare how much you weigh with how tall you are. · A BMI lower than 18.5 is considered underweight. · A BMI between 18.5 and 24.9 is considered healthy. · A BMI between 25 and 29.9 is considered overweight. A BMI of 30 or higher is considered obese. If your BMI is in the normal range, it means that you have a lower risk for weight-related health problems. If your BMI is in the overweight or obese range, you may be at increased risk for weight-related health problems, such as high blood pressure, heart disease, stroke, arthritis or joint pain, and diabetes. If your BMI is in the underweight range, you may be at increased risk for health problems such as fatigue, lower protection (immunity) against illness, muscle loss, bone loss, hair loss, and hormone problems. BMI is just one measure of your risk for weight-related health problems. You may be at higher risk for health problems if you are not active, you eat an unhealthy diet, or you drink too much alcohol or use tobacco products. Follow-up care is a key part of your treatment and safety. Be sure to make and go to all appointments, and call your doctor if you are having problems. It's also a good idea to know your test results and keep a list of the medicines you take. How can you care for yourself at home? · Practice healthy eating habits. This includes eating plenty of fruits, vegetables, whole grains, lean protein, and low-fat dairy. · If your doctor recommends it, get more exercise. Walking is a good choice. Bit by bit, increase the amount you walk every day. Try for at least 30 minutes on most days of the week. · Do not smoke. Smoking can increase your risk for health problems.  If you need help quitting, talk to your doctor about stop-smoking programs and medicines. These can increase your chances of quitting for good. · Limit alcohol to 2 drinks a day for men and 1 drink a day for women. Too much alcohol can cause health problems. If you have a BMI higher than 25 · Your doctor may do other tests to check your risk for weight-related health problems. This may include measuring the distance around your waist. A waist measurement of more than 40 inches in men or 35 inches in women can increase the risk of weight-related health problems. · Talk with your doctor about steps you can take to stay healthy or improve your health. You may need to make lifestyle changes to lose weight and stay healthy, such as changing your diet and getting regular exercise. If you have a BMI lower than 18.5 · Your doctor may do other tests to check your risk for health problems. · Talk with your doctor about steps you can take to stay healthy or improve your health. You may need to make lifestyle changes to gain or maintain weight and stay healthy, such as getting more healthy foods in your diet and doing exercises to build muscle. Where can you learn more? Go to http://luan-lea.info/. Enter S176 in the search box to learn more about \"Body Mass Index: Care Instructions. \" Current as of: October 13, 2016 Content Version: 11.4 © 4934-7170 [employer]. Care instructions adapted under license by [employer] (which disclaims liability or warranty for this information). If you have questions about a medical condition or this instruction, always ask your healthcare professional. [address] any warranty or liability for your use of this information.

## 2019-08-28 DIAGNOSIS — E78.79 FAMILIAL HYPERCHOLANEMIA: ICD-10-CM
